# Patient Record
Sex: MALE | Race: BLACK OR AFRICAN AMERICAN | NOT HISPANIC OR LATINO
[De-identification: names, ages, dates, MRNs, and addresses within clinical notes are randomized per-mention and may not be internally consistent; named-entity substitution may affect disease eponyms.]

---

## 2017-08-22 PROBLEM — Z00.00 ENCOUNTER FOR PREVENTIVE HEALTH EXAMINATION: Status: ACTIVE | Noted: 2017-08-22

## 2017-08-29 ENCOUNTER — APPOINTMENT (OUTPATIENT)
Dept: MRI IMAGING | Facility: IMAGING CENTER | Age: 57
End: 2017-08-29
Payer: COMMERCIAL

## 2017-08-29 ENCOUNTER — OUTPATIENT (OUTPATIENT)
Dept: OUTPATIENT SERVICES | Facility: HOSPITAL | Age: 57
LOS: 1 days | End: 2017-08-29
Payer: COMMERCIAL

## 2017-08-29 DIAGNOSIS — R94.8 ABNORMAL RESULTS OF FUNCTION STUDIES OF OTHER ORGANS AND SYSTEMS: ICD-10-CM

## 2017-08-29 DIAGNOSIS — M60.9 MYOSITIS, UNSPECIFIED: ICD-10-CM

## 2017-08-29 PROCEDURE — 73718 MRI LOWER EXTREMITY W/O DYE: CPT

## 2017-08-29 PROCEDURE — 73718 MRI LOWER EXTREMITY W/O DYE: CPT | Mod: 26,RT

## 2020-06-15 ENCOUNTER — APPOINTMENT (OUTPATIENT)
Dept: DISASTER EMERGENCY | Facility: CLINIC | Age: 60
End: 2020-06-15

## 2020-06-15 VITALS
RESPIRATION RATE: 16 BRPM | WEIGHT: 190.04 LBS | TEMPERATURE: 98 F | DIASTOLIC BLOOD PRESSURE: 99 MMHG | HEIGHT: 68 IN | SYSTOLIC BLOOD PRESSURE: 180 MMHG | OXYGEN SATURATION: 99 % | HEART RATE: 93 BPM

## 2020-06-15 RX ORDER — CHLORHEXIDINE GLUCONATE 213 G/1000ML
1 SOLUTION TOPICAL ONCE
Refills: 0 | Status: DISCONTINUED | OUTPATIENT
Start: 2020-06-17 | End: 2020-07-02

## 2020-06-15 NOTE — H&P ADULT - HISTORY OF PRESENT ILLNESS
SKELETON    60 yo male, PMHx HTN, Hyperlipidemia, DM type 2, Hypothyroidism, BPH presents to cardiologist, Dr Marcelo Vazquez, endorsing left sided chest pain with exertion, relieved with rest. Pt also endorses dyspnea with exertion of two blocks worsening over the last year. NST 02/12/2020 shows medium sized partially reversible perfusion abnormality of moderate to severe intensity located in the anterior septal segment of LV. EF 60-65%.     In light of pt's risk factors, CCS Class II anginal symptoms, and abnormal NST pt presents for recommended cardiac catheterization with possible intervention. COVID swab done at [facility] [address] on 06/15 at 11am.     Pt to bring in medications    60 yo male, PMHx HTN, Hyperlipidemia, DM type 2, Hypothyroidism, BPH presents to cardiologist, Dr Marcelo Vazquez, endorsing left sided chest pain with shortness of breath with exertion of 2-3 blocks, relieved with rest, worsening over the past year.  Denies dizziness, palpitations, nausea, PND/orthopnea, LE edema, claudication. NST 02/12/2020 shows medium sized partially reversible perfusion abnormality of moderate to severe intensity located in the anterior septal segment of LV. EF 60-65%.     In light of pt's risk factors, CCS Class II anginal symptoms, and abnormal NST pt presents for recommended cardiac catheterization with possible intervention. COVID PCR negative [facility] on 06/15/2020, results in HIE     60 yo male, PMHx HTN, Hyperlipidemia, DM type 2, Hypothyroidism, BPH presents to cardiologist, Dr Marcelo Vazquez, endorsing left sided chest pain with shortness of breath with exertion of 2-3 blocks, relieved with rest, worsening over the past year.  Denies dizziness, palpitations, nausea, PND/orthopnea, LE edema, claudication. NST 02/12/2020 shows medium sized partially reversible perfusion abnormality of moderate to severe intensity located in the anterior septal segment of LV, EF 60-65%.     In light of pt's risk factors, CCS Class II anginal symptoms, and abnormal NST pt presents for recommended cardiac catheterization with possible intervention. COVID PCR negative [facility] on 06/15/2020, results in HIE     58 y/o male, PMHx HTN, Hyperlipidemia, DM type 2, known LBBB, Hypothyroidism, and BPH who presented to cardiologist, Dr Marcelo Vazquez, endorsing left sided chest pain with shortness of breath with exertion of 2-3 blocks, relieved with rest, worsening over the past year.  Denies dizziness, palpitations, nausea, PND/orthopnea, LE edema, claudication. NST 02/12/2020 shows medium sized partially reversible perfusion abnormality of moderate to severe intensity located in the anterior septal segment of LV, EF 60-65%.     In light of pt's risk factors, CCS Class II anginal symptoms, and abnormal NST pt presents for recommended cardiac catheterization with possible intervention.

## 2020-06-15 NOTE — H&P ADULT - NSHPLABSRESULTS_GEN_ALL_CORE
15.3   8.86  )-----------( 182      ( 17 Jun 2020 11:25 )             48.9     06-17    140  |  103  |  21  ----------------------------<  110<H>  see note   |  22  |  1.01    Ca    9.9      17 Jun 2020 11:25    TPro  8.5<H>  /  Alb  4.7  /  TBili  0.4  /  DBili  x   /  AST  see note  /  ALT  see note  /  AlkPhos  73  06-17

## 2020-06-15 NOTE — H&P ADULT - ASSESSMENT
60 y/o male, PMHx HTN, Hyperlipidemia, DM type 2, Hypothyroidism, BPH who presents for cardiac catheterization with possible intervention if clinically indicated due to patient's risk factors, CCS class II anginal symptoms, and abnormal NST results.     EKG revealed sinus rhythm at 82 bpm with known LBBB. Labs ..... Patient states he took his daily home medication of Aspirin 81 mg on 06/17/2020 AM. Patient was ordered for Plavix 600 mg once. Patient was also ordered for NS 75 cc/hour x 4 hours. Patient also states he did not take his home medication of Valsartan 160 mg daily on 06/17/2020 AM and was, subsequently, hypertensive when he arrived to the cath lab holding area. Patient was ordered for Losartan 100 mg once.   					  ASA: III				Mallampati class: II	            Anginal Class: II    Sedation Plan: Moderate   Patient Is Suitable Candidate For Sedation: Yes     Risks & benefits of procedure and sedation and risks and benefits for the alternative therapy have been explained to the patient in layman’s terms including but not limited to: allergic reaction, bleeding, infection, arrhythmia, respiratory compromise, renal and vascular compromise, limb damage, MI, CVA, emergent CABG/Vascular Surgery and death. Informed consent obtained and in chart.

## 2020-06-15 NOTE — H&P ADULT - NSICDXPASTMEDICALHX_GEN_ALL_CORE_FT
PAST MEDICAL HISTORY:  BPH (benign prostatic hyperplasia)     Diabetes mellitus     Hyperlipidemia     Hypertension     Hypothyroidism

## 2020-06-16 LAB — SARS-COV-2 N GENE NPH QL NAA+PROBE: NOT DETECTED

## 2020-06-17 ENCOUNTER — OUTPATIENT (OUTPATIENT)
Dept: OUTPATIENT SERVICES | Facility: HOSPITAL | Age: 60
LOS: 1 days | Discharge: HOME | End: 2020-06-17
Payer: COMMERCIAL

## 2020-06-17 LAB
A1C WITH ESTIMATED AVERAGE GLUCOSE RESULT: 6.8 % — HIGH (ref 4–5.6)
ALBUMIN SERPL ELPH-MCNC: 4.7 G/DL — SIGNIFICANT CHANGE UP (ref 3.3–5)
ALP SERPL-CCNC: 73 U/L — SIGNIFICANT CHANGE UP (ref 40–120)
ALT FLD-CCNC: SIGNIFICANT CHANGE UP U/L (ref 10–45)
ANION GAP SERPL CALC-SCNC: 11 MMOL/L — SIGNIFICANT CHANGE UP (ref 5–17)
ANION GAP SERPL CALC-SCNC: 12 MMOL/L — SIGNIFICANT CHANGE UP (ref 5–17)
ANION GAP SERPL CALC-SCNC: 15 MMOL/L — SIGNIFICANT CHANGE UP (ref 5–17)
APTT BLD: 35 SEC — SIGNIFICANT CHANGE UP (ref 27.5–36.3)
AST SERPL-CCNC: SIGNIFICANT CHANGE UP U/L (ref 10–40)
BASOPHILS # BLD AUTO: 0.08 K/UL — SIGNIFICANT CHANGE UP (ref 0–0.2)
BASOPHILS NFR BLD AUTO: 0.9 % — SIGNIFICANT CHANGE UP (ref 0–2)
BILIRUB SERPL-MCNC: 0.4 MG/DL — SIGNIFICANT CHANGE UP (ref 0.2–1.2)
BUN SERPL-MCNC: 21 MG/DL — SIGNIFICANT CHANGE UP (ref 7–23)
CALCIUM SERPL-MCNC: 9.4 MG/DL — SIGNIFICANT CHANGE UP (ref 8.4–10.5)
CALCIUM SERPL-MCNC: 9.6 MG/DL — SIGNIFICANT CHANGE UP (ref 8.4–10.5)
CALCIUM SERPL-MCNC: 9.9 MG/DL — SIGNIFICANT CHANGE UP (ref 8.4–10.5)
CHLORIDE SERPL-SCNC: 103 MMOL/L — SIGNIFICANT CHANGE UP (ref 96–108)
CHLORIDE SERPL-SCNC: 105 MMOL/L — SIGNIFICANT CHANGE UP (ref 96–108)
CHLORIDE SERPL-SCNC: 107 MMOL/L — SIGNIFICANT CHANGE UP (ref 96–108)
CHOLEST SERPL-MCNC: 177 MG/DL — SIGNIFICANT CHANGE UP (ref 10–199)
CK MB CFR SERPL CALC: 15 NG/ML — HIGH (ref 0–6.7)
CK SERPL-CCNC: 2760 U/L — HIGH (ref 30–200)
CO2 SERPL-SCNC: 22 MMOL/L — SIGNIFICANT CHANGE UP (ref 22–31)
CO2 SERPL-SCNC: 23 MMOL/L — SIGNIFICANT CHANGE UP (ref 22–31)
CO2 SERPL-SCNC: 23 MMOL/L — SIGNIFICANT CHANGE UP (ref 22–31)
CREAT SERPL-MCNC: 1.01 MG/DL — SIGNIFICANT CHANGE UP (ref 0.5–1.3)
CREAT SERPL-MCNC: 1.02 MG/DL — SIGNIFICANT CHANGE UP (ref 0.5–1.3)
CREAT SERPL-MCNC: 1.13 MG/DL — SIGNIFICANT CHANGE UP (ref 0.5–1.3)
EOSINOPHIL # BLD AUTO: 0.06 K/UL — SIGNIFICANT CHANGE UP (ref 0–0.5)
EOSINOPHIL NFR BLD AUTO: 0.7 % — SIGNIFICANT CHANGE UP (ref 0–6)
ESTIMATED AVERAGE GLUCOSE: 148 MG/DL — HIGH (ref 68–114)
GLUCOSE SERPL-MCNC: 110 MG/DL — HIGH (ref 70–99)
GLUCOSE SERPL-MCNC: 115 MG/DL — HIGH (ref 70–99)
GLUCOSE SERPL-MCNC: 124 MG/DL — HIGH (ref 70–99)
HCT VFR BLD CALC: 39.9 % — SIGNIFICANT CHANGE UP (ref 39–50)
HCT VFR BLD CALC: 48.9 % — SIGNIFICANT CHANGE UP (ref 39–50)
HDLC SERPL-MCNC: 38 MG/DL — LOW
HGB BLD-MCNC: 13.1 G/DL — SIGNIFICANT CHANGE UP (ref 13–17)
HGB BLD-MCNC: 15.3 G/DL — SIGNIFICANT CHANGE UP (ref 13–17)
IMM GRANULOCYTES NFR BLD AUTO: 0.2 % — SIGNIFICANT CHANGE UP (ref 0–1.5)
INR BLD: 1.05 — SIGNIFICANT CHANGE UP (ref 0.88–1.16)
LIPID PNL WITH DIRECT LDL SERPL: 98 MG/DL — SIGNIFICANT CHANGE UP
LYMPHOCYTES # BLD AUTO: 3.5 K/UL — HIGH (ref 1–3.3)
LYMPHOCYTES # BLD AUTO: 39.5 % — SIGNIFICANT CHANGE UP (ref 13–44)
MCHC RBC-ENTMCNC: 26.1 PG — LOW (ref 27–34)
MCHC RBC-ENTMCNC: 27 PG — SIGNIFICANT CHANGE UP (ref 27–34)
MCHC RBC-ENTMCNC: 31.3 GM/DL — LOW (ref 32–36)
MCHC RBC-ENTMCNC: 32.8 GM/DL — SIGNIFICANT CHANGE UP (ref 32–36)
MCV RBC AUTO: 82.3 FL — SIGNIFICANT CHANGE UP (ref 80–100)
MCV RBC AUTO: 83.4 FL — SIGNIFICANT CHANGE UP (ref 80–100)
MONOCYTES # BLD AUTO: 1.05 K/UL — HIGH (ref 0–0.9)
MONOCYTES NFR BLD AUTO: 11.9 % — SIGNIFICANT CHANGE UP (ref 2–14)
NEUTROPHILS # BLD AUTO: 4.15 K/UL — SIGNIFICANT CHANGE UP (ref 1.8–7.4)
NEUTROPHILS NFR BLD AUTO: 46.8 % — SIGNIFICANT CHANGE UP (ref 43–77)
NRBC # BLD: 0 /100 WBCS — SIGNIFICANT CHANGE UP (ref 0–0)
NRBC # BLD: 0 /100 WBCS — SIGNIFICANT CHANGE UP (ref 0–0)
PLATELET # BLD AUTO: 155 K/UL — SIGNIFICANT CHANGE UP (ref 150–400)
PLATELET # BLD AUTO: 182 K/UL — SIGNIFICANT CHANGE UP (ref 150–400)
POTASSIUM SERPL-MCNC: 4.1 MMOL/L — SIGNIFICANT CHANGE UP (ref 3.5–5.3)
POTASSIUM SERPL-MCNC: 4.2 MMOL/L — SIGNIFICANT CHANGE UP (ref 3.5–5.3)
POTASSIUM SERPL-MCNC: SIGNIFICANT CHANGE UP MMOL/L (ref 3.5–5.3)
POTASSIUM SERPL-SCNC: 4.1 MMOL/L — SIGNIFICANT CHANGE UP (ref 3.5–5.3)
POTASSIUM SERPL-SCNC: 4.2 MMOL/L — SIGNIFICANT CHANGE UP (ref 3.5–5.3)
POTASSIUM SERPL-SCNC: SIGNIFICANT CHANGE UP MMOL/L (ref 3.5–5.3)
PROT SERPL-MCNC: 8.5 G/DL — HIGH (ref 6–8.3)
PROTHROM AB SERPL-ACNC: 12 SEC — SIGNIFICANT CHANGE UP (ref 10–12.9)
RBC # BLD: 4.85 M/UL — SIGNIFICANT CHANGE UP (ref 4.2–5.8)
RBC # BLD: 5.86 M/UL — HIGH (ref 4.2–5.8)
RBC # FLD: 14.8 % — HIGH (ref 10.3–14.5)
RBC # FLD: 14.9 % — HIGH (ref 10.3–14.5)
SODIUM SERPL-SCNC: 140 MMOL/L — SIGNIFICANT CHANGE UP (ref 135–145)
SODIUM SERPL-SCNC: 140 MMOL/L — SIGNIFICANT CHANGE UP (ref 135–145)
SODIUM SERPL-SCNC: 141 MMOL/L — SIGNIFICANT CHANGE UP (ref 135–145)
TOTAL CHOLESTEROL/HDL RATIO MEASUREMENT: 4.7 RATIO — SIGNIFICANT CHANGE UP (ref 3.4–9.6)
TRIGL SERPL-MCNC: 204 MG/DL — HIGH (ref 10–149)
WBC # BLD: 6.98 K/UL — SIGNIFICANT CHANGE UP (ref 3.8–10.5)
WBC # BLD: 8.86 K/UL — SIGNIFICANT CHANGE UP (ref 3.8–10.5)
WBC # FLD AUTO: 6.98 K/UL — SIGNIFICANT CHANGE UP (ref 3.8–10.5)
WBC # FLD AUTO: 8.86 K/UL — SIGNIFICANT CHANGE UP (ref 3.8–10.5)

## 2020-06-17 PROCEDURE — 82553 CREATINE MB FRACTION: CPT

## 2020-06-17 PROCEDURE — C1874: CPT

## 2020-06-17 PROCEDURE — 85730 THROMBOPLASTIN TIME PARTIAL: CPT

## 2020-06-17 PROCEDURE — 80061 LIPID PANEL: CPT

## 2020-06-17 PROCEDURE — 82962 GLUCOSE BLOOD TEST: CPT

## 2020-06-17 PROCEDURE — C9600: CPT | Mod: LD

## 2020-06-17 PROCEDURE — 93458 L HRT ARTERY/VENTRICLE ANGIO: CPT

## 2020-06-17 PROCEDURE — 82550 ASSAY OF CK (CPK): CPT

## 2020-06-17 PROCEDURE — C1769: CPT

## 2020-06-17 PROCEDURE — 36415 COLL VENOUS BLD VENIPUNCTURE: CPT

## 2020-06-17 PROCEDURE — 85027 COMPLETE CBC AUTOMATED: CPT

## 2020-06-17 PROCEDURE — 93010 ELECTROCARDIOGRAM REPORT: CPT

## 2020-06-17 PROCEDURE — 80048 BASIC METABOLIC PNL TOTAL CA: CPT

## 2020-06-17 PROCEDURE — C1887: CPT

## 2020-06-17 PROCEDURE — 80053 COMPREHEN METABOLIC PANEL: CPT

## 2020-06-17 PROCEDURE — 85025 COMPLETE CBC W/AUTO DIFF WBC: CPT

## 2020-06-17 PROCEDURE — C1725: CPT

## 2020-06-17 PROCEDURE — 83036 HEMOGLOBIN GLYCOSYLATED A1C: CPT

## 2020-06-17 PROCEDURE — 93005 ELECTROCARDIOGRAM TRACING: CPT

## 2020-06-17 PROCEDURE — C1894: CPT

## 2020-06-17 PROCEDURE — 85610 PROTHROMBIN TIME: CPT

## 2020-06-17 RX ORDER — ROSUVASTATIN CALCIUM 5 MG/1
1 TABLET ORAL
Qty: 30 | Refills: 0
Start: 2020-06-17 | End: 2020-07-16

## 2020-06-17 RX ORDER — ASPIRIN/CALCIUM CARB/MAGNESIUM 324 MG
1 TABLET ORAL
Qty: 0 | Refills: 0 | DISCHARGE

## 2020-06-17 RX ORDER — OMEGA-3 ACID ETHYL ESTERS 1 G
1 CAPSULE ORAL
Qty: 0 | Refills: 0 | DISCHARGE

## 2020-06-17 RX ORDER — LOSARTAN POTASSIUM 100 MG/1
100 TABLET, FILM COATED ORAL ONCE
Refills: 0 | Status: COMPLETED | OUTPATIENT
Start: 2020-06-17 | End: 2020-06-17

## 2020-06-17 RX ORDER — ROSUVASTATIN CALCIUM 5 MG/1
1 TABLET ORAL
Qty: 0 | Refills: 0 | DISCHARGE

## 2020-06-17 RX ORDER — TAMSULOSIN HYDROCHLORIDE 0.4 MG/1
1 CAPSULE ORAL
Qty: 0 | Refills: 0 | DISCHARGE

## 2020-06-17 RX ORDER — LEVOTHYROXINE SODIUM 125 MCG
1 TABLET ORAL
Qty: 0 | Refills: 0 | DISCHARGE

## 2020-06-17 RX ORDER — SODIUM CHLORIDE 9 MG/ML
500 INJECTION INTRAMUSCULAR; INTRAVENOUS; SUBCUTANEOUS
Refills: 0 | Status: DISCONTINUED | OUTPATIENT
Start: 2020-06-17 | End: 2020-07-02

## 2020-06-17 RX ORDER — CLOPIDOGREL BISULFATE 75 MG/1
600 TABLET, FILM COATED ORAL ONCE
Refills: 0 | Status: COMPLETED | OUTPATIENT
Start: 2020-06-17 | End: 2020-06-17

## 2020-06-17 RX ORDER — CLOPIDOGREL BISULFATE 75 MG/1
1 TABLET, FILM COATED ORAL
Qty: 30 | Refills: 11
Start: 2020-06-17 | End: 2021-06-11

## 2020-06-17 RX ORDER — VALSARTAN 80 MG/1
1 TABLET ORAL
Qty: 0 | Refills: 0 | DISCHARGE

## 2020-06-17 RX ORDER — METFORMIN HYDROCHLORIDE 850 MG/1
1 TABLET ORAL
Qty: 0 | Refills: 0 | DISCHARGE

## 2020-06-17 RX ADMIN — CLOPIDOGREL BISULFATE 600 MILLIGRAM(S): 75 TABLET, FILM COATED ORAL at 12:02

## 2020-06-17 RX ADMIN — SODIUM CHLORIDE 75 MILLILITER(S): 9 INJECTION INTRAMUSCULAR; INTRAVENOUS; SUBCUTANEOUS at 12:03

## 2020-06-17 RX ADMIN — LOSARTAN POTASSIUM 100 MILLIGRAM(S): 100 TABLET, FILM COATED ORAL at 12:57

## 2020-06-17 NOTE — PROGRESS NOTE ADULT - SUBJECTIVE AND OBJECTIVE BOX
Interventional Cardiology PA Post PCI SDA Discharge Note      Patient without complaints. Ambulated and voided without difficulties    Afebrile, VSS    Ext:    		Right Radial :   golf ball sized hematoma compressed out without reformation,  no   bleeding, dressing; C/D/I      Pulses:    intact RAD to baseline     A/P:   60 y/o male, PMHx HTN, Hyperlipidemia, DM type 2, known LBBB, Hypothyroidism, and BPH who presented to cardiologist, Dr Marcelo Vazquez, endorsing left sided chest pain with shortness of breath with exertion of 2-3 blocks, relieved with rest, worsening over the past year.  Denies dizziness, palpitations, nausea, PND/orthopnea, LE edema, claudication. NST 02/12/2020 shows medium sized partially reversible perfusion abnormality of moderate to severe intensity located in the anterior septal segment of LV, EF 60-65%.     In light of pt's risk factors, CCS Class II anginal symptoms, and abnormal NST pt presents for recommended cardiac catheterization with possible intervention.      s/p PCI: SUKHJINDER pLAD.     1.	       Follow-up with PMD/Cardiologist Dr. Vazquez within in 1-2 weeks.  2.            Post procedure labs/EKG reviewed and stable.    3.            Pt given instructions on importance of taking antiplatelet medication.    4. 	        Stable for discharge as per attending Dr. Vazquez after bed rest, pt voids, groin/wrist stable and 30 minutes of ambulation.  5.             Prescriptions for Aspirin/Plavix e-prescribed and submitted to patient's pharmacy.    6.             Patient will continue statin Crestor 20mg daily.   7.	        Discharge forms signed and copies in chart Interventional Cardiology PA Post PCI SDA Discharge Note      Patient without complaints. Ambulated and voided without difficulties    Afebrile, VSS    Ext:    		Right Radial :   golf ball sized hematoma compressed out without reformation,  no   bleeding, dressing; C/D/I      Pulses:    intact RAD to baseline     A/P:   60 y/o male, PMHx HTN, Hyperlipidemia, DM type 2, known LBBB, Hypothyroidism, and BPH who presented to cardiologist, Dr Marcelo Vazquez, endorsing left sided chest pain with shortness of breath with exertion of 2-3 blocks, relieved with rest, worsening over the past year.  Denies dizziness, palpitations, nausea, PND/orthopnea, LE edema, claudication. NST 02/12/2020 shows medium sized partially reversible perfusion abnormality of moderate to severe intensity located in the anterior septal segment of LV, EF 60-65%.     In light of pt's risk factors, CCS Class II anginal symptoms, and abnormal NST pt presents for recommended cardiac catheterization with possible intervention.      s/p PCI: SUKHJINDER pLAD. LM short normal. LAD prox diffuse 80% stenosis. LCx co-dominant, large luminal irregularities. RCA co-dominant, 30% stenois mRCA. EDP 11. No LV gram.     1.	       Follow-up with PMD/Cardiologist Dr. Vazquez within in 1-2 weeks.  2.            Post procedure labs/EKG reviewed and stable.    3.            Pt given instructions on importance of taking antiplatelet medication.    4. 	        Stable for discharge as per attending Dr. Vazquez after bed rest, pt voids, groin/wrist stable and 30 minutes of ambulation.  5.             Prescriptions for Aspirin/Plavix e-prescribed and submitted to patient's pharmacy.    6.             Patient will increase statin Crestor 20mg to 40mg daily given LDL of 98   7.	        Discharge forms signed and copies in chart Interventional Cardiology PA Post PCI SDA Discharge Note      Patient without complaints. Ambulated and voided without difficulties    Afebrile, VSS    Ext:    		Right Radial :   golf ball sized hematoma compressed out without reformation,  no   bleeding, dressing; C/D/I      Pulses:    intact RAD to baseline     A/P:   60 y/o male, PMHx HTN, Hyperlipidemia, DM type 2, known LBBB, Hypothyroidism, and BPH who presented to cardiologist, Dr Marcelo Vazquez, endorsing left sided chest pain with shortness of breath with exertion of 2-3 blocks, relieved with rest, worsening over the past year.  Denies dizziness, palpitations, nausea, PND/orthopnea, LE edema, claudication. NST 02/12/2020 shows medium sized partially reversible perfusion abnormality of moderate to severe intensity located in the anterior septal segment of LV, EF 60-65%.     In light of pt's risk factors, CCS Class II anginal symptoms, and abnormal NST pt presents for recommended cardiac catheterization with possible intervention.      s/p PCI: SUKHJINDER pLAD. LM short normal. LAD prox diffuse 80% stenosis. LCx co-dominant, large luminal irregularities. RCA co-dominant, 30% stenois mRCA. EDP 11. No LV gram.     1.	       Follow-up with PMD/Cardiologist Dr. Vazquez within in 1-2 weeks.  2.            Post procedure labs/EKG reviewed and stable.    3.            Pt given instructions on importance of taking antiplatelet medication.    4. 	        Stable for discharge as per attending Dr. Vazquez after bed rest, pt voids, groin/wrist stable and 30 minutes of ambulation.  5.             Patient will increase statin Crestor 20mg to 40mg daily given LDL of 98 and goal LDL 70 for CAD  6.              Prescriptions for Plavix/Crestor e-prescribed and submitted to patient's pharmacy.  7.	        Discharge forms signed and copies in chart

## 2020-06-17 NOTE — PROGRESS NOTE ADULT - SUBJECTIVE AND OBJECTIVE BOX
CORONARY ANGIOGRAPHY  06/17/2020    Indication: UA, MENDEZ, abnormal stress test, known LBBB    Conclusion  Frailty Index: 4  Syntax Score: Low    1. Coronary Angiography  LM: normal  LAD: proximal 80% diffuse stenosis  LCx: co-dominant, large caliber, luminal irregularities  RCA: co-dominant, mid 30% stenosis    2. Left Heart Cath  no LV gram performed  LVEDP normal 11 mmHg  no AS on pull back    3. PCI  s/p successful PCI 80% prox LAD with 3.5x38 Promus SUKHJINDER post dilated in proximal stent with 4.0x15 NC Emerge balloon with residual 0% stenosis and TRACY III flow with excellent angiographic results     Recommendations  s/p successful PCI of prox LAD with SUKHJINDER x1  continue dual anti platelet therapy with ASA 81 mg po daily and plavix 75 mg po daily x 1 year  d/c home in 6-8 hours post PCI if patient clinically stable  follow up with Dr. Elder as outpatient    Access: R radial, TR band

## 2020-06-29 DIAGNOSIS — I25.110 ATHEROSCLEROTIC HEART DISEASE OF NATIVE CORONARY ARTERY WITH UNSTABLE ANGINA PECTORIS: ICD-10-CM

## 2020-06-29 DIAGNOSIS — E78.5 HYPERLIPIDEMIA, UNSPECIFIED: ICD-10-CM

## 2020-06-29 DIAGNOSIS — I10 ESSENTIAL (PRIMARY) HYPERTENSION: ICD-10-CM

## 2020-06-29 DIAGNOSIS — R94.39 ABNORMAL RESULT OF OTHER CARDIOVASCULAR FUNCTION STUDY: ICD-10-CM
